# Patient Record
Sex: FEMALE | Race: WHITE
[De-identification: names, ages, dates, MRNs, and addresses within clinical notes are randomized per-mention and may not be internally consistent; named-entity substitution may affect disease eponyms.]

---

## 2019-08-09 ENCOUNTER — HOSPITAL ENCOUNTER (OUTPATIENT)
Dept: HOSPITAL 10 - SDS | Age: 49
LOS: 1 days | Discharge: HOME | End: 2019-08-10
Attending: OBSTETRICS & GYNECOLOGY
Payer: COMMERCIAL

## 2019-08-09 ENCOUNTER — HOSPITAL ENCOUNTER (OUTPATIENT)
Dept: HOSPITAL 91 - MS1 | Age: 49
LOS: 1 days | Discharge: HOME | End: 2019-08-10
Payer: COMMERCIAL

## 2019-08-09 VITALS — HEART RATE: 57 BPM | SYSTOLIC BLOOD PRESSURE: 109 MMHG | DIASTOLIC BLOOD PRESSURE: 56 MMHG | RESPIRATION RATE: 17 BRPM

## 2019-08-09 VITALS — DIASTOLIC BLOOD PRESSURE: 57 MMHG | RESPIRATION RATE: 22 BRPM | SYSTOLIC BLOOD PRESSURE: 107 MMHG | HEART RATE: 62 BPM

## 2019-08-09 VITALS — HEART RATE: 67 BPM | RESPIRATION RATE: 16 BRPM | DIASTOLIC BLOOD PRESSURE: 67 MMHG | SYSTOLIC BLOOD PRESSURE: 141 MMHG

## 2019-08-09 VITALS — DIASTOLIC BLOOD PRESSURE: 56 MMHG | RESPIRATION RATE: 22 BRPM | HEART RATE: 68 BPM | SYSTOLIC BLOOD PRESSURE: 116 MMHG

## 2019-08-09 VITALS — DIASTOLIC BLOOD PRESSURE: 56 MMHG | RESPIRATION RATE: 19 BRPM | SYSTOLIC BLOOD PRESSURE: 109 MMHG | HEART RATE: 54 BPM

## 2019-08-09 VITALS
BODY MASS INDEX: 24.9 KG/M2 | WEIGHT: 149.47 LBS | WEIGHT: 149.47 LBS | BODY MASS INDEX: 24.9 KG/M2 | HEIGHT: 65 IN | HEIGHT: 65 IN

## 2019-08-09 VITALS — DIASTOLIC BLOOD PRESSURE: 57 MMHG | SYSTOLIC BLOOD PRESSURE: 104 MMHG | RESPIRATION RATE: 16 BRPM | HEART RATE: 58 BPM

## 2019-08-09 VITALS — HEART RATE: 66 BPM | SYSTOLIC BLOOD PRESSURE: 113 MMHG | RESPIRATION RATE: 20 BRPM | DIASTOLIC BLOOD PRESSURE: 58 MMHG

## 2019-08-09 VITALS — RESPIRATION RATE: 20 BRPM | DIASTOLIC BLOOD PRESSURE: 55 MMHG | HEART RATE: 60 BPM | SYSTOLIC BLOOD PRESSURE: 106 MMHG

## 2019-08-09 VITALS — HEART RATE: 59 BPM | SYSTOLIC BLOOD PRESSURE: 107 MMHG | DIASTOLIC BLOOD PRESSURE: 59 MMHG | RESPIRATION RATE: 16 BRPM

## 2019-08-09 VITALS — RESPIRATION RATE: 21 BRPM | HEART RATE: 60 BPM | DIASTOLIC BLOOD PRESSURE: 55 MMHG | SYSTOLIC BLOOD PRESSURE: 104 MMHG

## 2019-08-09 VITALS — RESPIRATION RATE: 18 BRPM | HEART RATE: 59 BPM | SYSTOLIC BLOOD PRESSURE: 104 MMHG | DIASTOLIC BLOOD PRESSURE: 52 MMHG

## 2019-08-09 VITALS — HEART RATE: 58 BPM | SYSTOLIC BLOOD PRESSURE: 102 MMHG | DIASTOLIC BLOOD PRESSURE: 55 MMHG | RESPIRATION RATE: 18 BRPM

## 2019-08-09 VITALS — HEART RATE: 68 BPM | DIASTOLIC BLOOD PRESSURE: 59 MMHG | SYSTOLIC BLOOD PRESSURE: 110 MMHG | RESPIRATION RATE: 20 BRPM

## 2019-08-09 VITALS — HEART RATE: 59 BPM | RESPIRATION RATE: 22 BRPM | SYSTOLIC BLOOD PRESSURE: 103 MMHG | DIASTOLIC BLOOD PRESSURE: 57 MMHG

## 2019-08-09 VITALS — RESPIRATION RATE: 18 BRPM | DIASTOLIC BLOOD PRESSURE: 56 MMHG | HEART RATE: 62 BPM | SYSTOLIC BLOOD PRESSURE: 104 MMHG

## 2019-08-09 VITALS — SYSTOLIC BLOOD PRESSURE: 112 MMHG | HEART RATE: 63 BPM | RESPIRATION RATE: 16 BRPM | DIASTOLIC BLOOD PRESSURE: 53 MMHG

## 2019-08-09 VITALS — DIASTOLIC BLOOD PRESSURE: 49 MMHG | HEART RATE: 58 BPM | RESPIRATION RATE: 16 BRPM | SYSTOLIC BLOOD PRESSURE: 108 MMHG

## 2019-08-09 VITALS — HEART RATE: 60 BPM | SYSTOLIC BLOOD PRESSURE: 107 MMHG | RESPIRATION RATE: 21 BRPM | DIASTOLIC BLOOD PRESSURE: 55 MMHG

## 2019-08-09 VITALS — HEART RATE: 60 BPM | RESPIRATION RATE: 19 BRPM | SYSTOLIC BLOOD PRESSURE: 105 MMHG | DIASTOLIC BLOOD PRESSURE: 54 MMHG

## 2019-08-09 VITALS — RESPIRATION RATE: 18 BRPM | HEART RATE: 64 BPM | SYSTOLIC BLOOD PRESSURE: 106 MMHG | DIASTOLIC BLOOD PRESSURE: 56 MMHG

## 2019-08-09 DIAGNOSIS — N80.1: ICD-10-CM

## 2019-08-09 DIAGNOSIS — K66.0: ICD-10-CM

## 2019-08-09 DIAGNOSIS — N80.3: Primary | ICD-10-CM

## 2019-08-09 LAB
ADD MAN DIFF?: NO
ANION GAP: 5 (ref 5–13)
ANION GAP: 6 (ref 5–13)
BASOPHIL #: 0 10^3/UL (ref 0–0.1)
BASOPHILS %: 0.3 % (ref 0–2)
BLOOD UREA NITROGEN: 10 MG/DL (ref 7–20)
BLOOD UREA NITROGEN: 9 MG/DL (ref 7–20)
CALCIUM: 9 MG/DL (ref 8.4–10.2)
CALCIUM: 9.1 MG/DL (ref 8.4–10.2)
CARBON DIOXIDE: 26 MMOL/L (ref 21–31)
CARBON DIOXIDE: 26 MMOL/L (ref 21–31)
CHLORIDE: 106 MMOL/L (ref 97–110)
CHLORIDE: 106 MMOL/L (ref 97–110)
CREATININE: 0.76 MG/DL (ref 0.44–1)
CREATININE: 0.77 MG/DL (ref 0.44–1)
EOSINOPHILS #: 0 10^3/UL (ref 0–0.5)
EOSINOPHILS %: 0.5 % (ref 0–7)
GLUCOSE: 79 MG/DL (ref 70–220)
GLUCOSE: 84 MG/DL (ref 70–220)
HEMATOCRIT: 42.9 % (ref 37–47)
HEMATOCRIT: 44.6 % (ref 37–47)
HEMOGLOBIN: 13.9 G/DL (ref 12–16)
HEMOGLOBIN: 14.4 G/DL (ref 12–16)
IMMATURE GRANS #M: 0.02 10^3/UL (ref 0–0.03)
IMMATURE GRANS % (M): 0.3 % (ref 0–0.43)
INR: 0.89
LYMPHOCYTES #: 2 10^3/UL (ref 0.8–2.9)
LYMPHOCYTES %: 26 % (ref 15–51)
MEAN CORPUSCULAR HEMOGLOBIN: 29.3 PG (ref 29–33)
MEAN CORPUSCULAR HGB CONC: 32.4 G/DL (ref 32–37)
MEAN CORPUSCULAR VOLUME: 90.5 FL (ref 82–101)
MEAN PLATELET VOLUME: 9.8 FL (ref 7.4–10.4)
MONOCYTE #: 0.5 10^3/UL (ref 0.3–0.9)
MONOCYTES %: 6.2 % (ref 0–11)
NEUTROPHIL #: 5.2 10^3/UL (ref 1.6–7.5)
NEUTROPHILS %: 66.7 % (ref 39–77)
NUCLEATED RED BLOOD CELLS #: 0 10^3/UL (ref 0–0)
NUCLEATED RED BLOOD CELLS%: 0 /100WBC (ref 0–0)
PARTIAL THROMBOPLASTIN TIME: 27.7 SEC (ref 23–35)
PLATELET COUNT: 219 10^3/UL (ref 140–415)
POTASSIUM: 4.3 MMOL/L (ref 3.5–5.1)
POTASSIUM: 4.4 MMOL/L (ref 3.5–5.1)
PROTIME: 12.1 SEC (ref 11.9–14.9)
PT RATIO: 0.9
RED BLOOD COUNT: 4.74 10^6/UL (ref 4.2–5.4)
RED CELL DISTRIBUTION WIDTH: 12.3 % (ref 11.5–14.5)
SODIUM: 137 MMOL/L (ref 135–144)
SODIUM: 138 MMOL/L (ref 135–144)
WHITE BLOOD COUNT: 7.7 10^3/UL (ref 4.8–10.8)

## 2019-08-09 PROCEDURE — 85610 PROTHROMBIN TIME: CPT

## 2019-08-09 PROCEDURE — 87086 URINE CULTURE/COLONY COUNT: CPT

## 2019-08-09 PROCEDURE — 85025 COMPLETE CBC W/AUTO DIFF WBC: CPT

## 2019-08-09 PROCEDURE — 88104 CYTOPATH FL NONGYN SMEARS: CPT

## 2019-08-09 PROCEDURE — 86901 BLOOD TYPING SEROLOGIC RH(D): CPT

## 2019-08-09 PROCEDURE — 85018 HEMOGLOBIN: CPT

## 2019-08-09 PROCEDURE — 86900 BLOOD TYPING SEROLOGIC ABO: CPT

## 2019-08-09 PROCEDURE — 85730 THROMBOPLASTIN TIME PARTIAL: CPT

## 2019-08-09 PROCEDURE — 88304 TISSUE EXAM BY PATHOLOGIST: CPT

## 2019-08-09 PROCEDURE — 84703 CHORIONIC GONADOTROPIN ASSAY: CPT

## 2019-08-09 PROCEDURE — 81001 URINALYSIS AUTO W/SCOPE: CPT

## 2019-08-09 PROCEDURE — 85014 HEMATOCRIT: CPT

## 2019-08-09 PROCEDURE — 86850 RBC ANTIBODY SCREEN: CPT

## 2019-08-09 PROCEDURE — 58662 LAPAROSCOPY EXCISE LESIONS: CPT

## 2019-08-09 PROCEDURE — 80048 BASIC METABOLIC PNL TOTAL CA: CPT

## 2019-08-09 RX ADMIN — BUPIVACAINE HYDROCHLORIDE AND EPINEPHRINE BITARTRATE 1 ML: 2.5; .005 INJECTION, SOLUTION EPIDURAL; INFILTRATION; INTRACAUDAL; PERINEURAL at 17:44

## 2019-08-09 RX ADMIN — PYRIDOXINE HYDROCHLORIDE 1 MLS/HR: 100 INJECTION, SOLUTION INTRAMUSCULAR; INTRAVENOUS at 15:35

## 2019-08-09 RX ADMIN — CEFAZOLIN SODIUM 1 MLS/HR: 2 SOLUTION INTRAVENOUS at 15:51

## 2019-08-09 RX ADMIN — OXYCODONE HYDROCHLORIDE AND ACETAMINOPHEN 1 TAB: 5; 325 TABLET ORAL at 22:23

## 2019-08-09 RX ADMIN — PYRIDOXINE HYDROCHLORIDE 1 MLS/HR: 100 INJECTION, SOLUTION INTRAMUSCULAR; INTRAVENOUS at 18:50

## 2019-08-09 RX ADMIN — HEPARIN SODIUM 1 UNIT: 5000 INJECTION, SOLUTION INTRAVENOUS; SUBCUTANEOUS at 21:03

## 2019-08-09 RX ADMIN — HEPARIN SODIUM 1 UNIT: 5000 INJECTION, SOLUTION INTRAVENOUS; SUBCUTANEOUS at 15:00

## 2019-08-09 RX ADMIN — PYRIDOXINE HYDROCHLORIDE SCH MLS/HR: 100 INJECTION, SOLUTION INTRAMUSCULAR; INTRAVENOUS at 18:50

## 2019-08-09 NOTE — OPR
Date/Time of Note


Date/Time of Note


DATE: 8/9/19 


TIME: 17:56





Operative Report


Procedure Date:  Aug 9, 2019


Preoperative Diagnosis


Endometriosis


Dense intra-abdominal adhesions


Postoperative Diagnosis


Same


Operation/Procedure Performed


Laparoscopic lysis of intestinal adhesions


Surgeon


see signature line


Assistant


Dr. Hastings


Anesthesia Type:  general


Estimated Blood Loss:  none


Transfusion


   none


Specimen


none


Grafts/Implants


none


Complications


none


Pt Condition Post Procedure:  stable


Disposition:  PACU


Indications


Patient is undergoing laparoscopic removal of bilateral ovarian cysts from 


endometriosis. Adhesions between the sigmoid colon and the ovary and uterus were


encountered. I was intraoperatively consulted to help management via enterolysis


Procedure Description


An additional RUQ port was placed and thereafter the laparoscopic scissors were 


used to lyse adhesions between the bowel and the ovary and uterus taking care to


avoid injury to bowel tissue. Some bleeding from some cut uterine tissue was 


encountered and this was controlled with morrow beam coagulation. The area of 


enterolysis was thoroughly inpected and confirmed to be free of injury.











CELIA HERNANDEZ                    Aug 9, 2019 18:03

## 2019-08-09 NOTE — PREAC
Date/Time of Note


Date/Time of Note


DATE: 8/9/19 


TIME: 15:36





Anesthesia Eval and Record


Evaluation


Time Pre-Procedure Interview


DATE: 8/9/19 


TIME: 15:36


Age


49


Sex


female


NPO:  8 hrs


Preoperative diagnosis


Endometriosis


Planned procedure


Exploratory laparoscopy





Past Medical History


Past Medical History:  None





Surgery & Anesthesia Issues


No known issue





Meds


Anticoagulation:  No


Beta Blocker within 24 hr:  No


Reason Beta Blocker not given:  Pt. not on B-Blocker


No Active Prescriptions or Reported Meds





Current Medications


Lactated Ringer's 1,000 ml @  125 mls/hr Q8H IV ;  Start 8/9/19 at 14:46


Meds reviewed:  Yes





Allergies


Coded Allergies:  


     No Known Allergy (Unverified , 8/9/19)


Allergies Reviewed:  Yes





Labs/Studies


Labs Reviewed:  Reviewed by anesthesiologist


Result Diagram:  


8/9/19 1320                                                                     


          8/9/19 1320





Laboratory Tests


8/9/19 13:20








Pregnancy test:  Negative


Studies:  ECG





Pre-procedure Exam


Last vitals





Vital Signs


  Date      Temp  Pulse  Resp  B/P (MAP)   Pulse Ox  O2          O2 Flow    FiO2


Time                                                 Delivery    Rate


    8/9/19  98.7     67    16      141/67       100  Room Air


     13:00                           (91)





Airway:  Adequate mouth opening, Adequate thyromental dist


Mallampati:  Mallampati II


Teeth:  Normal


Lung:  Normal


Heart:  Normal





ASA Physical Status


ASA physical status:  2


Emergency:  None





Planned Anesthetic


General/MAC:  ETT





Planned Pain Management


Sub-arachniod narcotics, Parenteral pain med





Pre-operative Attestations


Prior to commencing anesthesia and surgery, the patient was re-evaluated, there 


was verification of:


*The patient's identity


*The results of appropriate recent lab work and preoperative vital signs


*The above evaluation not changing prior to induction


*Anesthetic plan, risk benefits, alternative and complications discussed with 


patient/family; questions answered; patient/family understands, accepts and 


wishes to proceed.











EUNICE GUADALUPE MD               Aug 9, 2019 15:37

## 2019-08-09 NOTE — HPN
Date/Time of Note


Date/Time of Note


DATE: 8/9/19 


TIME: 14:46





Interval H&P Admission Note


Pt. seen H&P reviewed:  No system changes











KATHERINE BELTRAN MD               Aug 9, 2019 14:46

## 2019-08-09 NOTE — PAC
Date/Time of Note


Date/Time of Note


DATE: 8/9/19 


TIME: 18:34





Post-Anesthesia Notes


Post-Anesthesia Note


Last documented vital signs





Vital Signs


  Date      Temp  Pulse  Resp  B/P (MAP)   Pulse Ox  O2          O2 Flow    FiO2


Time                                                 Delivery    Rate


    8/9/19  98.7     67    16      141/67       100  Room Air


     13:00                           (91)





Activity:  WNL


Respiratory function:  WNL


Cardiovascular function:  WNL


Mental status:  Baseline


Pain reasonably controlled:  Yes


Hydration appropriate:  Yes


Nausea/Vomiting absent:  Yes


Comments


BP:112/56, P:78, Spo2:100%, T:98,8











EUNICE GUADALUPE MD               Aug 9, 2019 18:34

## 2019-08-09 NOTE — OPR
Date/Time of Note


Date/Time of Note


DATE: 8/9/19 


TIME: 18:13





Operative Report


Free Text/Dictation


August 9, 2019


Procedure Date:  Aug 9, 2019


Preoperative Diagnosis


1.  Dyspareunia


2.Dysmenorrhea


3.  Pelvic pain


4 bilateral.  Ovarian endometrioma, symptomatic


Patient requests surgical management


Postoperative Diagnosis


Severe pelvic endometriosis as well as bilateral ovarian endometrioma with 


obliterated cul-de-sac and adhesion of both endometrioma to the lateral pelvic 


fold some as well as adhesion of the sigmoid colon to posterior uterine wall 


noted.  Small fibroid noted.  Appears to be intramural.


Evidence of inguinal hernia in the left side noted


Operation/Procedure Performed


Diagnostic laparoscopy


Pelvic washing


Laparoscopic bilateral ovarian cystectomy with fulguration of endometrial 


implants


Extensive lysis of adhesion


Intra-Op consult with general surgeon with lysis of adhesion of sigmoid to 


posterior uterine wall


Surgeon


see signature line


Assistant


Dr. Chay Lowery MD


Anesthesia Type:  general, spinal


Anesthesiologist:  EUNICE GUADALUPE MD


Estimated Blood Loss:  50 - 100 ml's


Transfusion


   none


Specimen


Bilateral ovarian cyst wall 


pelvic washing


Grafts/Implants


none


Complications


none


Pt Condition Post Procedure:  stable


Disposition:  PACU


Procedure Description


49-year-old female with history of bilateral ovarian endometrioma and pelvic 


pain, dyspareunia dysmenorrhea that noted to have increased size of the 


bilateral ovarian endometrioma and desires to proceed with surgical management. 


Risk and benefit of procedure including risk of infection, bleeding, damage to 


surrounding structures including bowel and bladder and risk of transfusion 


including but not limited to blood borne infection including HIV, hepatitis B C 


and transfusion reaction as well as risk of conversion to open procedure 


discussed with patient informed consent was obtained.  Patient was consented for


diagnostic laparoscopy, laparoscopic bilateral ovarian cystectomy possible open 


possible blood transfusion possible lysis of adhesion possible any other 


indicated procedure.


Patient then received 2 g of Ancef was taken to the OR.  She received a dose of 


5000 units of subcutaneous heparin due to the fact the patient was on OCP up to 


3 4 days prior to surgery.  Patient was then placed in the dorsolithotomy 


position.  Exam under anesthesia performed after timeout procedure.  Then after 


prepped and draped in a sterile fashion a weighted speculum placed inside the 


vagina.  Anterior lip of the cervix grasped using tenaculum.  Uterine depth was 


measured using uterine sound was noted to be about 11 inch.  Then using Hegar 


dilators the cervix was serially dilated until it accommodated HUMI manipulator 


then the HUMI manipulator was placed and balloon was inflated and transfixed.  


Then the instrument removed from vagina.  Gloves was changed.  Attention was 


turned to the abdomen where first a 5 mm incision was made inside the umbilicus.


 Then using Veress needle after 2 stop sound and negative aspiration positive 


saline drop test confirmed accurate placement of the Veress needle.  


Insufflation started.  Filling pressure was -3.  Insufflation performed after 


adequate insufflation and the Veress needle was removed by using 5 mm trocar 


under direct visualization passed through the abdominal wall into the abdomen 


successfully.  Intra-abdominal and pelvis cavity was visualized and evaluated.  


Uterus was in the midline with evidence of a 2 cm intramural fibroid.  Then 


upper abdomen was also evaluated that was normal.  There was evidence of 


inguinal hernia in the left side noted.  Due to difficulty visualization of 


ovaries and tubes first the second trocar 5 mm in the left lower quadrant after 


negative transillumination test under direct visualization was passed through 


the abdominal wall and then this third trocar which was a 12 mm trocar in the 


right lower quadrant after negative transillumination test and under direct 


visualization passed through the abdominal wall.  Then using a grasper ovaries 


and tubes in both side evaluated.  There was evidence of dense adhesion of the 


ovary into the pelvic foci in both side and it appeared both ovaries and have 


endometrioma bilaterally.  There was evidence of a dense adhesion of the 


posterior uterine wall to the sigmoid colon as well as obliterated cul-de-sac 


was also noted.  At this point first  attempted was made to perform lysis of 


adhesion of the ovarian cyst in the right side.  First washing of the pelvis 


performed and fluid was sent to pathology then using atraumatic grasper and 


subsequently EndoShears scissor gentle leave the adhesion of the right 


endometrioma to the cul-de-sac was lysed and during lysis of adhesion the cyst 


was ruptured and chocolate cyst containing material was drained and was 


immediately suctioned.  Then attempt was made to remove the cyst wall however 


due to dense adhesion I could be able to remove partially the cyst wall and the 


specimen was sent to pathology.  Then the entire cyst wall bed was coagulated 


using argon.  Then the procedure proceeded in a similar fashion in the left side


where using atraumatic grasper as well as [place] attempt was made to lyse 


adhesion of the cyst in the left side from the left pelvic.  However during the 


lysis of adhesion the cyst opened and chocolate cyst material was drained that 


was immediately suctioned.  Again the cyst on the left side in a similar fashion


in several specimen partially removed and was sent to pathology and then the 


entire bed of the cyst on the left side was also coagulated using argon.  Good 


hemostasis of the operative site in both sides was obtained.  There was evidence


of dense adhesion of the sigmoid colon to the posterior uterine wall.  Intra-Op 


consult with  was performed with general surgeon who could be able to 


partially lyse adhesion of the sigmoid colon to the posterior uterine wall.  


Please refer to Dr. Lowery;s  note for this part of dictation.


The sigmoid colon and the bowel appeared to be intact during the procedure.  


Small nick to the muscle of the uterus in the posterior aspect where there was a


dhesion of the sigmoid to the posterior uterine wall noted that appeared to be 


oozing mildly and the oozing was controlled using argon beam as well as packing 


using fibrillar.  After irrigation and assurance about adequate hemostasis using


negative pressure check then upper abdomen and the rest of the pelvis and the 


abdomen was visualized and evaluated.  After assurance about adequate hemostasis


then instrument removed.  Desufflation of the abdomen and pelvis performed while


patient was in deep Trendelenburg and then the skin is all 4 trocar site 


repaired using 4-0 Monocryl.  Of note that the fourth trocar was placed as well 


in the right lateral abdomen by general surgeon during lysis of pelvic adhesion.


 Of note that at all times during the procedure attention was turned carefully 


to the bowel and ureter.  Operative site and the cyst area was away from the 


ureter.


Of note that due to significant dense adhesion in the cul-de-sac consistent with


severe endometriosis could not be able to trace the ureter at the level of the 


cul-de-sac however area of operation were all away from the ureter.  Ureters 


were seen and visualized at the level of the pelvic brim.


After completion of the abdominal procedure the trocar site repaired and then 


the attention was turned to the vagina where the HUMI manipulator was removed 


after desufflation of the balloon.


Patient tolerated the procedure well.  Sponge lap needle counts were correct x2 


patient was then transferred to recovery room in stable condition


Due to significant lysis of pelvic adhesion and the fact that patient received 


spinal anesthesia by anesthesiologist and the risk of urinary retention with 


Duramorph decision was made to keep the patient overnight.











KATHERINE BELTRAN MD               Aug 9, 2019 18:21

## 2019-08-10 VITALS — RESPIRATION RATE: 18 BRPM | HEART RATE: 58 BPM | DIASTOLIC BLOOD PRESSURE: 58 MMHG | SYSTOLIC BLOOD PRESSURE: 111 MMHG

## 2019-08-10 VITALS — HEART RATE: 67 BPM | RESPIRATION RATE: 18 BRPM | DIASTOLIC BLOOD PRESSURE: 56 MMHG | SYSTOLIC BLOOD PRESSURE: 110 MMHG

## 2019-08-10 LAB
ADD MAN DIFF?: NO
ADD UMIC: YES
BASOPHIL #: 0 10^3/UL (ref 0–0.1)
BASOPHILS %: 0.2 % (ref 0–2)
EOSINOPHILS #: 0 10^3/UL (ref 0–0.5)
EOSINOPHILS %: 0.2 % (ref 0–7)
HEMATOCRIT: 39.9 % (ref 37–47)
HEMOGLOBIN: 12.8 G/DL (ref 12–16)
IMMATURE GRANS #M: 0.04 10^3/UL (ref 0–0.03)
IMMATURE GRANS % (M): 0.4 % (ref 0–0.43)
LYMPHOCYTES #: 1.9 10^3/UL (ref 0.8–2.9)
LYMPHOCYTES %: 17 % (ref 15–51)
MEAN CORPUSCULAR HEMOGLOBIN: 29.5 PG (ref 29–33)
MEAN CORPUSCULAR HGB CONC: 32.1 G/DL (ref 32–37)
MEAN CORPUSCULAR VOLUME: 91.9 FL (ref 82–101)
MEAN PLATELET VOLUME: 9.7 FL (ref 7.4–10.4)
MONOCYTE #: 0.6 10^3/UL (ref 0.3–0.9)
MONOCYTES %: 5.5 % (ref 0–11)
NEUTROPHIL #: 8.6 10^3/UL (ref 1.6–7.5)
NEUTROPHILS %: 76.7 % (ref 39–77)
NUCLEATED RED BLOOD CELLS #: 0 10^3/UL (ref 0–0)
NUCLEATED RED BLOOD CELLS%: 0 /100WBC (ref 0–0)
PLATELET COUNT: 206 10^3/UL (ref 140–415)
RED BLOOD COUNT: 4.34 10^6/UL (ref 4.2–5.4)
RED CELL DISTRIBUTION WIDTH: 12.4 % (ref 11.5–14.5)
UR ASCORBIC ACID: NEGATIVE MG/DL
UR BILIRUBIN (DIP): NEGATIVE MG/DL
UR BLOOD (DIP): (no result) MG/DL
UR CLARITY: CLEAR
UR COLOR: YELLOW
UR GLUCOSE (DIP): NEGATIVE MG/DL
UR KETONES (DIP): (no result) MG/DL
UR LEUKOCYTE ESTERASE (DIP): NEGATIVE LEU/UL
UR MUCUS: (no result) /HPF
UR NITRITE (DIP): NEGATIVE MG/DL
UR PH (DIP): 6 (ref 5–9)
UR RBC: 56 /HPF (ref 0–5)
UR SPECIFIC GRAVITY (DIP): 1.02 (ref 1–1.03)
UR TOTAL PROTEIN (DIP): NEGATIVE MG/DL
UR UROBILINOGEN (DIP): NEGATIVE MG/DL
UR WBC: 4 /HPF (ref 0–5)
WHITE BLOOD COUNT: 11.2 10^3/UL (ref 4.8–10.8)

## 2019-08-10 RX ADMIN — PYRIDOXINE HYDROCHLORIDE 1 MLS/HR: 100 INJECTION, SOLUTION INTRAMUSCULAR; INTRAVENOUS at 05:48

## 2019-08-10 RX ADMIN — ONDANSETRON HYDROCHLORIDE 1 MG: 2 INJECTION, SOLUTION INTRAMUSCULAR; INTRAVENOUS at 07:47

## 2019-08-10 RX ADMIN — DEXAMETHASONE SODIUM PHOSPHATE PRN MG: 10 INJECTION, SOLUTION INTRAMUSCULAR; INTRAVENOUS at 07:47

## 2019-08-10 RX ADMIN — ONDANSETRON HYDROCHLORIDE 1 MG: 2 INJECTION, SOLUTION INTRAMUSCULAR; INTRAVENOUS at 01:53

## 2019-08-10 RX ADMIN — PYRIDOXINE HYDROCHLORIDE SCH MLS/HR: 100 INJECTION, SOLUTION INTRAMUSCULAR; INTRAVENOUS at 05:48

## 2019-08-10 RX ADMIN — DEXAMETHASONE SODIUM PHOSPHATE PRN MG: 10 INJECTION, SOLUTION INTRAMUSCULAR; INTRAVENOUS at 01:53

## 2019-08-10 RX ADMIN — MAGNESIUM HYDROXIDE 1 ML: 400 SUSPENSION ORAL at 11:58

## 2019-08-10 NOTE — PN
Date/Time of Note


Date/Time of Note


DATE: 8/10/19 


TIME: 11:44





Assessment/Plan


VTE Prophylaxis


Risk score (from Nsg)>0 risk:  4


SCD applied (from Nsg):  Yes


Pharmacological prophylaxis:  NA/contraindicated


Pharm contraindication:  low risk/ambulating





Lines/Catheters


IV Catheter Type (from Nrsg):  Peripheral IV


Urinary Cath still in place:  No





Assessment/Plan


Hospital Course


s/p LSC bilateral ovarian cystectomy for Bilateral ovaran endometriomas


Doing well


Nausea, related to anesthesia


Urinated, Ambulated, tolerated potato after received Zofran


Vitals are stable


Mild leukocytosis,reactive. Asymptomatic, afebile, abdomen soft. Normal bowel 


sounds audible 


I discussed with the patient about op findings and adhesion of the sigmoid in 


the back of the uterus. I explained to the patient signficant adhesion of the 


sigmoid colon that minimally lysed


Discussed again with Dr. Lowery, No concern for inury or damage to the sigmoid 


or rectum and approved Discharge home.


Patient doing well. Reports she feels well,


I give the patient strict precaution if she feels any abdominal pain more than 


the base line, any fever, chills, inability to tolerate po, Vomiting , abdominal


distension or any other concern to immediately RT to the hospital


Vitals are stable and patient appears stable for discharge,





I also gave her my direct office contact and my direct phone number to contact 


me for any concerns,


Patient verbalized understaing. will see her in the clinic for followup in 2 


days


Result Diagram:  


8/10/19 1113                                                                    


           8/9/19 1532





Results 24hrs





Laboratory Tests


Test
                    8/9/19
13:20  8/9/19
14:41  8/9/19
15:32  8/10/19
06:20


White Blood Count               7.7


Red Blood Count                4.74


Hemoglobin                     13.9                        14.4


Hematocrit                     42.9                        44.6


Mean Corpuscular               90.5


Volume


Mean Corpuscular               29.3


Hemoglobin


Mean Corpuscular              32.4  
  
             
             



Hemoglobin
Concent


Red Cell Distribution          12.3


Width


Platelet Count                  219


Mean Platelet Volume            9.8


Immature Granulocytes         0.300


%


Neutrophils %                  66.7


Lymphocytes %                  26.0


Monocytes %                     6.2


Eosinophils %                   0.5


Basophils %                     0.3


Nucleated Red Blood             0.0


Cells %


Immature Granulocytes         0.020


#


Neutrophils #                   5.2


Lymphocytes #                   2.0


Monocytes #                     0.5


Eosinophils #                   0.0


Basophils #                     0.0


Nucleated Red Blood             0.0


Cells #


Sodium Level                    138                         137


Potassium Level                 4.3                         4.4


Chloride Level                  106                         106


Carbon Dioxide Level             26                          26


Anion Gap                         6                           5


Blood Urea Nitrogen              10                           9


Creatinine                     0.77                        0.76


Est Glomerular Filtrat  > 60  
        
             > 60  
       



Rate
mL/min


Glucose Level                    84                          79


Calcium Level                   9.1                         9.0


Lab Scanned Report                     LAB


Prothrombin Time                                           12.1


Prothrombin Time Ratio                                      0.9


INR International       
              
                  0.89  
  



Normalized
Ratio


Activated               
              
                  27.7  
  



Partial
Thromboplast


Time


Urine Color                                                        YELLOW


Urine Clarity                                                      CLEAR


Urine pH                                                                   6.0


Urine Specific Gravity                                                   1.024


Urine Ketones                                                              1+  H


Urine Nitrite                                                      NEGATIVE


Urine Bilirubin                                                    NEGATIVE


Urine Urobilinogen                                                 NEGATIVE


Urine Leukocyte                                                    NEGATIVE


Esterase


Urine Microscopic RBC                                                      56  H


Urine Microscopic WBC                                                        4


Urine Mucus                                                        FEW  A


Urine Hemoglobin                                                           2+  H


Urine Glucose                                                      NEGATIVE


Urine Total Protein                                                NEGATIVE


Urine Pregnancy Test                                               NEGATIVE


Test
                   8/10/19
11:13  
             
             



White Blood Count            11.2  #H


Red Blood Count                4.34


Hemoglobin                     12.8


Hematocrit                     39.9


Mean Corpuscular               91.9


Volume


Mean Corpuscular               29.5


Hemoglobin


Mean Corpuscular              32.1  
  
             
             



Hemoglobin
Concent


Red Cell Distribution          12.4


Width


Platelet Count                  206


Mean Platelet Volume            9.7


Immature Granulocytes         0.400


%


Neutrophils %                  76.7


Lymphocytes %                  17.0


Monocytes %                     5.5


Eosinophils %                   0.2


Basophils %                     0.2


Nucleated Red Blood             0.0


Cells %


Immature Granulocytes        0.040  H


#


Neutrophils #                  8.6  H


Lymphocytes #                   1.9


Monocytes #                     0.6


Eosinophils #                   0.0


Basophils #                     0.0


Nucleated Red Blood             0.0


Cells #








Subjective


24 Hr Interval Summary


Free Text/Dictation


Patient was nauseated and resolved after she received Zofran. Could be able to 


tolerate PO , urinated, did not require pain meds x 12 hours


Ambulating in the room. Denies any fever or chills. Denies any nausea or 


vomiting now. Desires to go home.


Constitutional:  no complaints


Eyes:  no complaints


ENT:  no complaints


Respiratory:  no complaints


Cardiovascular:  no complaints


Gastrointestinal:  No no complaints, No pain, No blood, No constipation, No 


decreased appetite, No diarrhea, No flatus, No nausea, No passing stool, No 


vomiting, No other


Genitourinary:  bleeding; 


   No no complaints, No dysuria, No discharge, No flank pain, No hematuria, No 


other


Musculoskeletal:  No no complaints, No back pain, No bone/joint pain, No neck 


pain, No restricted range of motion, No swelling, No other


Skin:  No no complaints, No bruising, No erythema, No laceration, No pruritis, 


No rash, No skin lesions, No other


Neurologic:  No no complaints, No confusion, No dizziness, No focal-weakness, No


headache, No syncope, No seizure, No other


Endocrine:  No no complaints, No polyuria, No polydypsia, No dry skin, No temp 


intolerance, No other


Lymphatic:  No no complaints, No adenopathy, No tender nodes, No lymphadema, No 


other


Psychological:  anxiety


Immunologic:  No no complaints, No immunodeficiency, No pruritis, No rhinitis, 


No urticaria, No other





Exam/Review of Systems


Exam


Vitals





Vital Signs


  Date      Temp  Pulse  Resp  B/P (MAP)   Pulse Ox  O2          O2 Flow    FiO2


Time                                                 Delivery    Rate


   8/10/19  98.2     58    18      111/58        99  Room Air


     07:18                           (75)








Intake and Output





8/9/19


8/9/19


8/10/19





1515:00


23:00


07:00





IntakeIntake Total


1300 ml





OutputOutput Total


100 ml


1850 ml





BalanceBalance


-100 ml


-550 ml











Constitutional:  alert, oriented, well developed


Psych:  no complaints


Head:  normocephalic, atraumatic


Eyes:  nl conjunctiva, EOMI, nl lids


ENMT:  nl external ears & nose


Neck:  supple


Respiratory:  clear to auscultation


Cardiovascular:  regular rate and rhythm, nl pulses


Gastrointestinal:  soft, other (Abdomen non distended, Incision clean and dry 


and intact. No distension, soft, )


Extremities:  No normal pulses, No calf tenderness, No cyanosis, No clubbing, No


edema, No pitting pedal edema, No palpable cord, No tenderness, No other


Neurological:  No CNS II-XII intact, No nl mental status, No nl speech, No nl 


strength, No confused, No DTR's symmetric, No focal weakness, No lethargic, No 


numbness, No reflexes, No unresponsive, No other


Skin:  No nl turgor, No rash or lesions, No diaphoresis, No ecchymosis, No 


laceration, No puncture, No other


Lymph:  No nl lymph nodes, No enlarged, No nontender, No other





Results


Results 24hrs





Laboratory Tests


Test
                    8/9/19
13:20  8/9/19
14:41  8/9/19
15:32  8/10/19
06:20


White Blood Count               7.7


Red Blood Count                4.74


Hemoglobin                     13.9                        14.4


Hematocrit                     42.9                        44.6


Mean Corpuscular               90.5


Volume


Mean Corpuscular               29.3


Hemoglobin


Mean Corpuscular              32.4  
  
             
             



Hemoglobin
Concent


Red Cell Distribution          12.3


Width


Platelet Count                  219


Mean Platelet Volume            9.8


Immature Granulocytes         0.300


%


Neutrophils %                  66.7


Lymphocytes %                  26.0


Monocytes %                     6.2


Eosinophils %                   0.5


Basophils %                     0.3


Nucleated Red Blood             0.0


Cells %


Immature Granulocytes         0.020


#


Neutrophils #                   5.2


Lymphocytes #                   2.0


Monocytes #                     0.5


Eosinophils #                   0.0


Basophils #                     0.0


Nucleated Red Blood             0.0


Cells #


Sodium Level                    138                         137


Potassium Level                 4.3                         4.4


Chloride Level                  106                         106


Carbon Dioxide Level             26                          26


Anion Gap                         6                           5


Blood Urea Nitrogen              10                           9


Creatinine                     0.77                        0.76


Est Glomerular Filtrat  > 60  
        
             > 60  
       



Rate
mL/min


Glucose Level                    84                          79


Calcium Level                   9.1                         9.0


Lab Scanned Report                     LAB


Prothrombin Time                                           12.1


Prothrombin Time Ratio                                      0.9


INR International       
              
                  0.89  
  



Normalized
Ratio


Activated               
              
                  27.7  
  



Partial
Thromboplast


Time


Urine Color                                                        YELLOW


Urine Clarity                                                      CLEAR


Urine pH                                                                   6.0


Urine Specific Gravity                                                   1.024


Urine Ketones                                                              1+  H


Urine Nitrite                                                      NEGATIVE


Urine Bilirubin                                                    NEGATIVE


Urine Urobilinogen                                                 NEGATIVE


Urine Leukocyte                                                    NEGATIVE


Esterase


Urine Microscopic RBC                                                      56  H


Urine Microscopic WBC                                                        4


Urine Mucus                                                        FEW  A


Urine Hemoglobin                                                           2+  H


Urine Glucose                                                      NEGATIVE


Urine Total Protein                                                NEGATIVE


Urine Pregnancy Test                                               NEGATIVE


Test
                   8/10/19
11:13  
             
             



White Blood Count            11.2  #H


Red Blood Count                4.34


Hemoglobin                     12.8


Hematocrit                     39.9


Mean Corpuscular               91.9


Volume


Mean Corpuscular               29.5


Hemoglobin


Mean Corpuscular              32.1  
  
             
             



Hemoglobin
Concent


Red Cell Distribution          12.4


Width


Platelet Count                  206


Mean Platelet Volume            9.7


Immature Granulocytes         0.400


%


Neutrophils %                  76.7


Lymphocytes %                  17.0


Monocytes %                     5.5


Eosinophils %                   0.2


Basophils %                     0.2


Nucleated Red Blood             0.0


Cells %


Immature Granulocytes        0.040  H


#


Neutrophils #                  8.6  H


Lymphocytes #                   1.9


Monocytes #                     0.6


Eosinophils #                   0.0


Basophils #                     0.0


Nucleated Red Blood             0.0


Cells #








Medications


Medication





Current Medications


Lactated Ringer's 1,000 ml @  100 mls/hr Q10H IV  Last administered on 8/10/19at


05:48; Admin Dose 100 MLS/HR;  Start 8/9/19 at 18:32


Acetaminophen (Tylenol Tab) 650 mg Q4H  PRN PO PAIN LEVEL 1-5;  Start 8/9/19 at 


19:00


Oxycodone/ Acetaminophen (Percocet (5/ 325)) 1 tab Q4H  PRN PO PAIN LEVEL 6-10 


Last administered on 8/9/19at 22:23; Admin Dose 1 TAB;  Start 8/9/19 at 19:00


Ondansetron HCl (Zofran Inj) 4 mg Q6H  PRN IV NAUSEA AND/OR VOMITING Last 


administered on 8/10/19at 07:47; Admin Dose 4 MG;  Start 8/9/19 at 19:00


Magnesium Hydroxide (Milk Of Mag) 15 ml ONCE  ONCE PO ;  Start 8/10/19 at 11:30;


 Stop 8/10/19 at 11:31;  Status UNKATHERINE MCKINNEY MD              Aug 10, 2019 11:52

## 2019-08-10 NOTE — PD.PPDC
OB/GYN Discharge Instruction


Condition


                 [id number]
Patient Condition:  [id number]
Good








Diet


                [id number]
Diet:  [id number]
Resume Regular Diet








Activity/Restrictions


              [id number]
Activity:      [id number]
Normal Activity


              [id number]
Restrictions:  Pkbkc2m
No Exercising


                                        No Lifting


                                        No Driving








Follow-up


Follow-up with Physician:  3, Day/Days


Provider Information:


Katherine Beltran MD





Return to clinic for


      [id number]
GYN Instructions:       [id number]
Fever greater than 101


                                         Chills


                                         Worsening abdominal pain


                                         Excessive Vaginal Bleeding


                                         More than 2 pads per hour


                                         Unable to tolerate diet


      [id number]
Surgical Instructions:  [id number]
Incisional Drainage


                                         Incisional Redness














KATHERINE BELTRAN MD              Aug 10, 2019 11:43

## 2019-08-10 NOTE — DS
Date/Time of Note


Date/Time of Note


DATE: 8/10/19 


TIME: 11:52





Discharge Summary


Admission/Discharge Info


Admit Date/Time





 08/10/2019


Discharge Date/Time


08/10/.2019


Discharge Diagnosis


Endometriosis


Bilateral ovarian endometrioma


Pelvic severe adhesion


Pelvic pain


Dysmenorrhea


Dysparaunia


Patient Condition:  Good


Consults


N/A


Procedures


Diagnostic LSC, LSC ovarian bilateral cystectomy, LYSis of adhesion, Drainage 


and removal of ovarian cysts


Hospital Course


s/p LSC bilateral ovarian cystectomy for Bilateral ovaran endometriomas


Doing well


Nausea, related to anesthesia


Urinated, Ambulated, tolerated potato after received Zofran


Vitals are stable


I discussed with the patient about op findings and adhesion of the sigmoid in 


the back of the uterus. I explained to the patient signficant adhesion of the 


sigmoid colon that minimally lysed


Discussed again with Dr. Lowery, No concern for inury or damage to the sigmoid 


or rectum and approved Discharge home.


Patient doing well. Reports she feels well,


I give the patient strict precaution if she feels any abdominal pain more than 


the base line, any fever, chills, inability to tolerate po, Vomiting , abdominal


distension or any other concern to immediately RT to the hospital


Vitals are stable and patient appears stable for discharge,





I also gave her my direct office contact and my direct phone number to contact 


me for any concerns,


Patient verbalized understaing. will see her in the clinic for followup in 2 


days


Home Meds


No Active Prescriptions or Reported Meds


Follow-up Plan


in 2 days with the offfice and 2 weeks post op


Primary Care Provider


Not On Staff Doctor


Time spent on discharge:   > 30 minutes


Pending Labs





Laboratory Tests


Test
               8/9/19
13:20   8/9/19
14:41     8/9/19
15:32   8/10/19
06:20


White Blood                  7.7  
              
                



Count
           10^3/ul
(4.8-10


                             .8)


Red Blood                   4.74  
              
                



Count
           10^6/ul
(4.20-5


                            .40)


Hemoglobin
                 13.9  
                         14.4  



                 g/dl
(12.0-16.0                 g/dl
(12.0-16.0


                               )                               )


Hematocrit
                 42.9  
                         44.6  



                   %
(37.0-47.0)                   %
(37.0-47.0)


Mean                        90.5  
              
                



Corpuscular      fl
(82.0-101.0)


Volume



Mean                        29.3  
              
                



Corpuscular       pg
(29.0-33.0)


Hemoglobin



Mean                        32.4  
              
                



Corpuscular      g/dl
(32.0-37.0


Hemoglobin
Conc                )


ent


Red Cell                    12.3  
              
                



Distribution       %
(11.5-14.5)


Width



Platelet Count
              219  
              
                



                 10^3/UL
(140-41


                              5)


Mean Platelet                9.8  
              
                



Volume
            fl
(7.4-10.4)


Immature                   0.300  
              
                



Granulocytes %
  %
(0.001-0.429)


Neutrophils %
              66.7  
              
                



                   %
(39.0-77.0)


Lymphocytes %
              26.0  
              
                



                   %
(15.0-51.0)


Monocytes %
                 6.2  
              
                



                    %
(0.0-11.0)


Eosinophils %
   0.5 %
(0.0-7.0)  
              
                



Basophils %
     0.3 %
(0.0-2.0)  
              
                



Nucleated Red                0.0  
              
                



Blood Cells %
   /100WBC
(0.0-0.


                              0)


Immature                   0.020  
              
                



Granulocytes #
  10^3/ul
(0.0-0.


                            031)


Neutrophils #
               5.2  
              
                



                 10^3/ul
(1.6-7.


                              5)


Lymphocytes #
               2.0  
              
                



                 10^3/ul
(0.8-2.


                              9)


Monocytes #
                 0.5  
              
                



                 10^3/ul
(0.3-0.


                              9)


Eosinophils #
               0.0  
              
                



                 10^3/ul
(0.0-0.


                              5)


Basophils #
                 0.0  
              
                



                 10^3/ul
(0.0-0.


                              1)


Nucleated Red                0.0  
              
                



Blood Cells #
   10^3/ul
(0.0-0.


                              0)


Sodium Level
                138  
                          137  



                 mmol/L
(135-144                 mmol/L
(135-144


                               )                               )


Potassium                    4.3  
                          4.4  



Level
           mmol/L
(3.5-5.1                 mmol/L
(3.5-5.1


                               )                               )


Chloride Level
              106  
                          106  



                 mmol/L
()                 mmol/L
()


Carbon Dioxide                26  
                           26  



Level
            mmol/L
(21-31)                  mmol/L
(21-31)


Anion Gap              6 (5-13)                        5 (5-13)


Blood Urea       10 mg/dl
(7-20)  
               9 mg/dl (7-20)  



Nitrogen
                                                      



Creatinine
                 0.77  
                         0.76  



                 mg/dl
(0.44-1.0                 mg/dl
(0.44-1.0


                              0)                              0)


Est Glomerular   > 60             
              > 60             



Filtrat          mL/min
(>60)                    mL/min
(>60)


Rate
mL/min


Glucose Level
                84  
                           79  



                  mg/dl
()                  mg/dl
()


Calcium Level
               9.1  
                          9.0  



                 mg/dl
(8.4-10.2                 mg/dl
(8.4-10.2


                               )                               )


Lab Scanned                       LAB [id number]


Report


Prothrombin      
                
                         12.1  



Time
                                            Sec
(11.9-14.9)


Prothrombin                                                 0.9


Time Ratio


INR              
                
                       0.89 
  



International


Normalized
Rati


o


Activated        
                
                         27.7  



Partial
Thrombo                                  Sec
(23.0-35.0)


plast Time


Urine Color
     
                
              
                YELLOW
(YELLOW


                                                                  )


Urine Clarity                                                     CLEAR (CLEAR)


Urine pH                                                          6.0 (5.0-9.0)


Urine Specific   
                
              
                1.024
(1.003-1


Gravity
                                                                   .030)


Urine Ketones
   
                
              
                            1+


                                                                  mg/dL
(NEGATIV


                                                                              E)


Urine Nitrite
   
                
              
                NEGATIVE


                                                                  mg/dL
(NEGATIV


                                                                  E)


Urine            
                
              
                NEGATIVE


Bilirubin
                                                        mg/dL
(NEGATIV


                                                                  E)


Urine            
                
              
                NEGATIVE


Urobilinogen
                                                     mg/dL
(NEGATIV


                                                                  E)


Urine Leukocyte  
                
              
                NEGATIVE
Adeline/u


Esterase
                                                         l


Urine                                                             56 /HPF (0-5)


Microscopic RBC


Urine                                                              4 /HPF (0-5)


Microscopic WBC


Urine Mucus
     
                
              
                FEW /HPF


                                                                  (NONE
SEEN)


Urine            
                
              
                            2+


Hemoglobin
                                                       mg/dL
(NEGATIV


                                                                              E)


Urine Glucose
   
                
              
                NEGATIVE


                                                                  mg/dL
(NEGATIV


                                                                  E)


Urine Total      
                
              
                NEGATIVE


Protein
                                                          mg/dl
(NEGATIV


                                                                  E)


Urine Pregnancy  
                
              
                NEGATIVE
(NEGA


Test
                                                             TIVE)


Test
              8/10/19
11:13  
              
                



White Blood                 11.2  
              
                



Count
           10^3/ul
(4.8-10


                             .8)


Red Blood                   4.34  
              
                



Count
           10^6/ul
(4.20-5


                            .40)


Hemoglobin
                 12.8  
              
                



                 g/dl
(12.0-16.0


                               )


Hematocrit
                 39.9  
              
                



                   %
(37.0-47.0)


Mean                        91.9  
              
                



Corpuscular      fl
(82.0-101.0)


Volume



Mean                        29.5  
              
                



Corpuscular       pg
(29.0-33.0)


Hemoglobin



Mean                        32.1  
              
                



Corpuscular      g/dl
(32.0-37.0


Hemoglobin
Conc                )


ent


Red Cell                    12.4  
              
                



Distribution       %
(11.5-14.5)


Width



Platelet Count
              206  
              
                



                 10^3/UL
(140-41


                              5)


Mean Platelet                9.7  
              
                



Volume
            fl
(7.4-10.4)


Immature                   0.400  
              
                



Granulocytes %
  %
(0.001-0.429)


Neutrophils %
              76.7  
              
                



                   %
(39.0-77.0)


Lymphocytes %
              17.0  
              
                



                   %
(15.0-51.0)


Monocytes %
                 5.5  
              
                



                    %
(0.0-11.0)


Eosinophils %
   0.2 %
(0.0-7.0)  
              
                



Basophils %
     0.2 %
(0.0-2.0)  
              
                



Nucleated Red                0.0  
              
                



Blood Cells %
   /100WBC
(0.0-0.


                              0)


Immature                   0.040  
              
                



Granulocytes #
  10^3/ul
(0.0-0.


                            031)


Neutrophils #
               8.6  
              
                



                 10^3/ul
(1.6-7.


                              5)


Lymphocytes #
               1.9  
              
                



                 10^3/ul
(0.8-2.


                              9)


Monocytes #
                 0.6  
              
                



                 10^3/ul
(0.3-0.


                              9)


Eosinophils #
               0.0  
              
                



                 10^3/ul
(0.0-0.


                              5)


Basophils #
                 0.0  
              
                



                 10^3/ul
(0.0-0.


                              1)


Nucleated Red                0.0  
              
                



Blood Cells #
   10^3/ul
(0.0-0.


                              0)














KATHERINE BELTRAN MD              Aug 10, 2019 11:54